# Patient Record
Sex: MALE | Race: WHITE | NOT HISPANIC OR LATINO | Employment: OTHER | URBAN - METROPOLITAN AREA
[De-identification: names, ages, dates, MRNs, and addresses within clinical notes are randomized per-mention and may not be internally consistent; named-entity substitution may affect disease eponyms.]

---

## 2019-06-28 ENCOUNTER — HOSPITAL ENCOUNTER (EMERGENCY)
Facility: HOSPITAL | Age: 34
Discharge: HOME | End: 2019-06-28
Attending: EMERGENCY MEDICINE
Payer: OTHER GOVERNMENT

## 2019-06-28 VITALS
DIASTOLIC BLOOD PRESSURE: 89 MMHG | RESPIRATION RATE: 18 BRPM | OXYGEN SATURATION: 100 % | WEIGHT: 176 LBS | HEART RATE: 53 BPM | SYSTOLIC BLOOD PRESSURE: 140 MMHG | TEMPERATURE: 98 F

## 2019-06-28 DIAGNOSIS — R10.13 EPIGASTRIC ABDOMINAL PAIN: Primary | ICD-10-CM

## 2019-06-28 LAB
ALBUMIN SERPL BCP-MCNC: 5.2 G/DL (ref 3.5–5.2)
ALP SERPL-CCNC: 77 U/L (ref 55–135)
ALT SERPL W/O P-5'-P-CCNC: 33 U/L (ref 10–44)
ANION GAP SERPL CALC-SCNC: 10 MMOL/L (ref 8–16)
AST SERPL-CCNC: 24 U/L (ref 10–40)
BASOPHILS # BLD AUTO: 0.02 K/UL (ref 0–0.2)
BASOPHILS NFR BLD: 0.4 % (ref 0–1.9)
BILIRUB SERPL-MCNC: 1.4 MG/DL (ref 0.1–1)
BILIRUB UR QL STRIP: NEGATIVE
BUN SERPL-MCNC: 15 MG/DL (ref 6–20)
CALCIUM SERPL-MCNC: 10.4 MG/DL (ref 8.7–10.5)
CHLORIDE SERPL-SCNC: 102 MMOL/L (ref 95–110)
CLARITY UR: CLEAR
CO2 SERPL-SCNC: 26 MMOL/L (ref 23–29)
COLOR UR: ABNORMAL
CREAT SERPL-MCNC: 1 MG/DL (ref 0.5–1.4)
DIFFERENTIAL METHOD: ABNORMAL
EOSINOPHIL # BLD AUTO: 0.1 K/UL (ref 0–0.5)
EOSINOPHIL NFR BLD: 1.8 % (ref 0–8)
ERYTHROCYTE [DISTWIDTH] IN BLOOD BY AUTOMATED COUNT: 12.9 % (ref 11.5–14.5)
EST. GFR  (AFRICAN AMERICAN): >60 ML/MIN/1.73 M^2
EST. GFR  (NON AFRICAN AMERICAN): >60 ML/MIN/1.73 M^2
GLUCOSE SERPL-MCNC: 79 MG/DL (ref 70–110)
GLUCOSE UR QL STRIP: NEGATIVE
HCT VFR BLD AUTO: 46.8 % (ref 40–54)
HGB BLD-MCNC: 16.1 G/DL (ref 14–18)
HGB UR QL STRIP: NEGATIVE
KETONES UR QL STRIP: ABNORMAL
LEUKOCYTE ESTERASE UR QL STRIP: NEGATIVE
LIPASE SERPL-CCNC: 13 U/L (ref 4–60)
LYMPHOCYTES # BLD AUTO: 1.8 K/UL (ref 1–4.8)
LYMPHOCYTES NFR BLD: 31.5 % (ref 18–48)
MCH RBC QN AUTO: 31.8 PG (ref 27–31)
MCHC RBC AUTO-ENTMCNC: 34.4 G/DL (ref 32–36)
MCV RBC AUTO: 92 FL (ref 82–98)
MONOCYTES # BLD AUTO: 0.5 K/UL (ref 0.3–1)
MONOCYTES NFR BLD: 8.8 % (ref 4–15)
NEUTROPHILS # BLD AUTO: 3.2 K/UL (ref 1.8–7.7)
NEUTROPHILS NFR BLD: 57.7 % (ref 38–73)
NITRITE UR QL STRIP: NEGATIVE
PH UR STRIP: 5 [PH] (ref 5–8)
PLATELET # BLD AUTO: 277 K/UL (ref 150–350)
PMV BLD AUTO: 9.8 FL (ref 9.2–12.9)
POTASSIUM SERPL-SCNC: 4.2 MMOL/L (ref 3.5–5.1)
PROT SERPL-MCNC: 8.7 G/DL (ref 6–8.4)
PROT UR QL STRIP: NEGATIVE
RBC # BLD AUTO: 5.07 M/UL (ref 4.6–6.2)
SODIUM SERPL-SCNC: 138 MMOL/L (ref 136–145)
SP GR UR STRIP: 1 (ref 1–1.03)
URN SPEC COLLECT METH UR: ABNORMAL
UROBILINOGEN UR STRIP-ACNC: NEGATIVE EU/DL
WBC # BLD AUTO: 5.56 K/UL (ref 3.9–12.7)

## 2019-06-28 PROCEDURE — 99284 EMERGENCY DEPT VISIT MOD MDM: CPT | Mod: 25

## 2019-06-28 PROCEDURE — C9113 INJ PANTOPRAZOLE SODIUM, VIA: HCPCS | Performed by: NURSE PRACTITIONER

## 2019-06-28 PROCEDURE — 83690 ASSAY OF LIPASE: CPT

## 2019-06-28 PROCEDURE — 85025 COMPLETE CBC W/AUTO DIFF WBC: CPT

## 2019-06-28 PROCEDURE — 81003 URINALYSIS AUTO W/O SCOPE: CPT

## 2019-06-28 PROCEDURE — 80053 COMPREHEN METABOLIC PANEL: CPT

## 2019-06-28 PROCEDURE — 25000003 PHARM REV CODE 250: Performed by: NURSE PRACTITIONER

## 2019-06-28 PROCEDURE — 96374 THER/PROPH/DIAG INJ IV PUSH: CPT

## 2019-06-28 PROCEDURE — 96375 TX/PRO/DX INJ NEW DRUG ADDON: CPT

## 2019-06-28 PROCEDURE — 96361 HYDRATE IV INFUSION ADD-ON: CPT

## 2019-06-28 PROCEDURE — 63600175 PHARM REV CODE 636 W HCPCS: Performed by: NURSE PRACTITIONER

## 2019-06-28 RX ORDER — ONDANSETRON 2 MG/ML
4 INJECTION INTRAMUSCULAR; INTRAVENOUS
Status: COMPLETED | OUTPATIENT
Start: 2019-06-28 | End: 2019-06-28

## 2019-06-28 RX ORDER — PANTOPRAZOLE SODIUM 40 MG/10ML
40 INJECTION, POWDER, LYOPHILIZED, FOR SOLUTION INTRAVENOUS
Status: COMPLETED | OUTPATIENT
Start: 2019-06-28 | End: 2019-06-28

## 2019-06-28 RX ORDER — HYOSCYAMINE SULFATE 0.12 MG/1
0.12 TABLET SUBLINGUAL
Status: COMPLETED | OUTPATIENT
Start: 2019-06-28 | End: 2019-06-28

## 2019-06-28 RX ORDER — HYOSCYAMINE SULFATE 0.125 MG
125 TABLET ORAL EVERY 4 HOURS PRN
Qty: 28 TABLET | Refills: 0 | Status: SHIPPED | OUTPATIENT
Start: 2019-06-28 | End: 2019-07-28

## 2019-06-28 RX ORDER — ONDANSETRON 4 MG/1
4 TABLET, FILM COATED ORAL EVERY 8 HOURS PRN
Qty: 12 TABLET | Refills: 0 | Status: SHIPPED | OUTPATIENT
Start: 2019-06-28

## 2019-06-28 RX ADMIN — PANTOPRAZOLE SODIUM 40 MG: 40 INJECTION, POWDER, FOR SOLUTION INTRAVENOUS at 10:06

## 2019-06-28 RX ADMIN — HYOSCYAMINE SULFATE 0.12 MG: 0.12 TABLET ORAL; SUBLINGUAL at 10:06

## 2019-06-28 RX ADMIN — ONDANSETRON 4 MG: 2 INJECTION INTRAMUSCULAR; INTRAVENOUS at 09:06

## 2019-06-28 RX ADMIN — SODIUM CHLORIDE 1000 ML: 0.9 INJECTION, SOLUTION INTRAVENOUS at 09:06

## 2019-06-28 NOTE — ED TRIAGE NOTES
"Pt reports "Mostly stomach pain, but I been having nausea, successfully vomit x 1 episode."  Onset of stomach pain x 1 week ago, subsided by Tues, then came back Friday (today) with the vomiting.  Last meal last night 2230, pt reports vomiting this am 0715 of food particle noted from last night.  Stomach 6-8/10 when cramping, not cramp 0-2/10.  Stomach pain 3-4/10, describe cramping, wavy, that comes and goes.  Pt reports taking tums last night.   "

## 2019-06-28 NOTE — ED PROVIDER NOTES
Encounter Date: 6/28/2019       History     Chief Complaint   Patient presents with    Abdominal Pain     reports having abdominal pain since last week. reports n/v     Chief complaint:  Abdominal pain     History of present illness:  Patient is a 33-year-old male with midepigastric pain for a period of 1 week.  He reports the sensation is cramping and aching at times.  He has used Tums which has been ineffective for the relief of his discomfort.  He reports associated history of 1 episode of nonbilious nonbloody vomiting.  Current severity pain is 2/10.  He reports the problem is intermittent and is worse in the mornings.  No significant medical history is pertinent to this complaint.  Patient's only medication is Truvada used as prep.    The history is provided by the patient and medical records. No  was used.     Review of patient's allergies indicates:  No Known Allergies  No past medical history on file.  No past surgical history on file.  No family history on file.  Social History     Tobacco Use    Smoking status: Not on file   Substance Use Topics    Alcohol use: Not on file    Drug use: Not on file     Review of Systems  Constitutional: Positive for appetite change (decreased). Negative for chills and fever.   HENT: Negative.  Negative for sore throat.    Eyes: Negative.    Respiratory: Negative for cough, chest tightness, shortness of breath and wheezing.    Cardiovascular: Negative for chest pain and palpitations.   Gastrointestinal: Positive for abdominal pain, nausea and vomiting. Negative for constipation and diarrhea.   Endocrine: Negative.    Genitourinary: Negative.  Negative for difficulty urinating, dysuria, frequency, hematuria and urgency.   Musculoskeletal: Negative.  Negative for back pain.   Skin: Negative.  Negative for rash.   Allergic/Immunologic: Negative.    Neurological: Negative.  Negative for weakness.   Hematological: Negative.  Does not bruise/bleed easily.    Psychiatric/Behavioral: Negative.        Physical Exam     Initial Vitals [06/28/19 0907]   BP Pulse Resp Temp SpO2   (!) 143/87 73 18 97.5 °F (36.4 °C) 100 %      MAP       --         Physical Exam  Constitutional: Vital signs are normal. He appears well-developed. He is cooperative.   HENT:   Head: Normocephalic and atraumatic.   Right Ear: External ear normal.   Left Ear: External ear normal.   Nose: Nose normal.   Eyes: Conjunctivae and EOM are normal. Pupils are equal, round, and reactive to light.   Neck: Trachea normal and normal range of motion. Neck supple.   Cardiovascular: Regular rhythm, S1 normal, S2 normal and normal heart sounds. Exam reveals no gallop.    No murmur heard.  Abdominal: Soft. Normal appearance. There is tenderness in the epigastric area.   Neurological: He is alert and oriented to person, place, and time. GCS eye subscore is 4. GCS verbal subscore is 5. GCS motor subscore is 6.   Skin: Skin is warm, dry and intact.     ED Course   Procedures  Labs Reviewed   COMPREHENSIVE METABOLIC PANEL - Abnormal; Notable for the following components:       Result Value    Total Protein 8.7 (*)     Total Bilirubin 1.4 (*)     All other components within normal limits   CBC W/ AUTO DIFFERENTIAL - Abnormal; Notable for the following components:    Mean Corpuscular Hemoglobin 31.8 (*)     All other components within normal limits   URINALYSIS, REFLEX TO URINE CULTURE - Abnormal; Notable for the following components:    Ketones, UA 1+ (*)     All other components within normal limits   LIPASE          Imaging Results          US Abdomen Limited (Final result)  Result time 06/28/19 11:24:43    Final result by Eliseo Hernandez MD (06/28/19 11:24:43)                 Impression:      1. Sonogram of the gallbladder and CBD is grossly unremarkable.      Electronically signed by: Eliseo Hernandez  Date:    06/28/2019  Time:    11:24             Narrative:    EXAMINATION:  US ABDOMEN LIMITED    CLINICAL  HISTORY:  Right upper quadrant and epigastric abdominal pain    TECHNIQUE:  Limited abdominal of the pancreas, gallbladder and common bile duct    COMPARISON:  None    FINDINGS:  Gallbladder: No radiodense shadowing stones, mural thickening, pericholecystic fluid.  Sonographer reports a negative sonographic Marin's sign.    Biliary system: Common duct is not dilated, measuring 2-mm.  No extrahepatic biliary ductal dilatation.    Pancreas: Pancreatic head/body is normal in size, morphology and echogenicity without appreciable fluid collection or sizable vascular lesion. Limited evaluation of the pancreatic tail secondary to overlying bowel gas.                                 Medical Decision Making:   Initial Assessment:   33-year-old male with epigastric abdominal pain, benign abdominal examination. Patient is afebrile and nontoxic appearing.  Vital signs within normal limits.  Differential Diagnosis:   Gastritis, reflux, PUD  ED Management:  Initial orders include CBC, chemistry, lipase, urinalysis, ultrasound limited, Levsin 0.125 mg sublingual, Protonix 40 mg IV, normal saline 1 L IV, Zofran 4 mg IV, NPO status.                   ED Course as of Jun 28 1240   Fri Jun 28, 2019   1003 CBC: leukocyte count was normal, the H&H was normal. The platelet count was normal.        CBC auto differential(!) [VC]   1024 The chemistry was negative for hypo-or hyper natremia, kalemia, chloridemia, or other electrolyte abnormalities; BUN and creatinine were within normal limits indicating normal kidney function, ALT and AST were within normal limits indicating normal liver function, there was no transaminitis.       Comprehensive metabolic panel(!) [VC]   1024 Lipase was within normal limits indicating unlikely pancreatitis or congestive obstruction of the hepatobiliary tree.     Lipase [VC]   1133 1. Sonogram of the gallbladder and CBD is grossly unremarkable.     US Abdomen Limited [VC]   1149 UA is negative for infection,  no nitrites, leukocytes, blood, or protein present.     Urinalysis, Reflex to Urine Culture(!) [VC]      ED Course User Index  [VC] Brandon iGpson DNP     Clinical Impression:       ICD-10-CM ICD-9-CM   1. Epigastric abdominal pain R10.13 789.06     Patient is discharged home in good condition to follow up with primary care provider.  GI referral provided.  Prescriptions for Levsin and Zofran provided.  Patient should return for any worsening or changes in condition.  Disposition:   Disposition: Discharged  Condition: Stable                        Brandon Gipson DNP  06/28/19 1256

## 2019-06-28 NOTE — DISCHARGE INSTRUCTIONS
Zofran for nausea.  Levsin for cramping.  Push fluids.  Follow up with gastro referral provided.  Return to the Emergency department for any worsening or failure to improve, otherwise follow up with your primary care provider.

## 2019-06-28 NOTE — MEDICAL/APP STUDENT
History     Chief Complaint   Patient presents with    Abdominal Pain     reports having abdominal pain since last week. reports n/v     Chief complaint:  Abdominal pain    History of present illness:  Patient is a 33-year-old male with midepigastric pain for a period of 1 week.  He reports the sensation is cramping and aching at times.  He has used Tums which has been ineffective for the relief of his discomfort.  He reports associated history of 1 episode of nonbilious nonbloody vomiting.  Current severity pain is 2/10.  He reports the problem is intermittent and is worse in the mornings.  No significant medical history is pertinent to this complaint.  Patient's only medication is Truvada used as prep.    The history is provided by the patient and medical records. No  was used.       No past medical history on file.    No past surgical history on file.    No family history on file.    Social History     Tobacco Use    Smoking status: Not on file   Substance Use Topics    Alcohol use: Not on file    Drug use: Not on file       Review of Systems   Constitutional: Positive for appetite change (decreased). Negative for chills and fever.   HENT: Negative.  Negative for sore throat.    Eyes: Negative.    Respiratory: Negative for cough, chest tightness, shortness of breath and wheezing.    Cardiovascular: Negative for chest pain and palpitations.   Gastrointestinal: Positive for abdominal pain, nausea and vomiting. Negative for constipation and diarrhea.   Endocrine: Negative.    Genitourinary: Negative.  Negative for difficulty urinating, dysuria, frequency, hematuria and urgency.   Musculoskeletal: Negative.  Negative for back pain.   Skin: Negative.  Negative for rash.   Allergic/Immunologic: Negative.    Neurological: Negative.  Negative for weakness.   Hematological: Negative.  Does not bruise/bleed easily.   Psychiatric/Behavioral: Negative.        Physical Exam   BP (!) 143/87 (BP  Location: Right arm, Patient Position: Sitting)   Pulse 73   Temp 97.5 °F (36.4 °C) (Oral)   Resp 18   Wt 79.8 kg (176 lb)   SpO2 100%     Physical Exam    Constitutional: Vital signs are normal. He appears well-developed. He is cooperative.   HENT:   Head: Normocephalic and atraumatic.   Right Ear: External ear normal.   Left Ear: External ear normal.   Nose: Nose normal.   Eyes: Conjunctivae and EOM are normal. Pupils are equal, round, and reactive to light.   Neck: Trachea normal and normal range of motion. Neck supple.   Cardiovascular: Regular rhythm, S1 normal, S2 normal and normal heart sounds. Exam reveals no gallop.    No murmur heard.  Abdominal: Soft. Normal appearance. There is tenderness in the epigastric area.   Neurological: He is alert and oriented to person, place, and time. GCS eye subscore is 4. GCS verbal subscore is 5. GCS motor subscore is 6.   Skin: Skin is warm, dry and intact.         ED Course     Following physical assessment I ordered a CBC, chemistry, lipase, urinalysis on the patient.  For his comfort I ordered normal saline 1 L, Zofran 4 mg IVP, Levsin 0.125 mg sublingual.  The patient will be re-evaluated following resulting of laboratories.